# Patient Record
Sex: FEMALE | Race: WHITE | NOT HISPANIC OR LATINO
[De-identification: names, ages, dates, MRNs, and addresses within clinical notes are randomized per-mention and may not be internally consistent; named-entity substitution may affect disease eponyms.]

---
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
## 2023-01-06 NOTE — ED PROVIDER NOTE - PROVIDER TOKENS
Attending Attestation (For Attendings USE Only)... FREE:[LAST:[YOUR PEDIATRICIAN],PHONE:[(   )    -],FAX:[(   )    -],FOLLOWUP:[1-3 Days]]

## 2023-03-28 NOTE — ED PROVIDER NOTE - IV ALTEPLASE EXCL REL HIDDEN
Assessment/Plan:  Problem List Items Addressed This Visit    None  Visit Diagnoses     Candida vaginitis    -  Primary    Relevant Medications    fluconazole (DIFLUCAN) 150 mg tablet    clotrimazole-betamethasone (LOTRISONE) 1-0 05 % cream    Other Relevant Orders    POCT wet mount (Completed)             Candida Vaginitis      Candida vaginitis diagnosed on exam today based on symptoms and clinical findings  Discussed with patient diagnosis in detail including risk factors, signs and symptoms  Treatment was indicated for relief of symptoms and was prescribed antibiotics  Discussed with patient vulvar hygiene and avoidance of intercourse during treatment  Antibiotics were prescribed and patient advised to complete course of antibiotics  She was asked to call if with untoward side effects and if no relief of symptoms after treatment  Follow-up is unnecessary if symptoms resolve  Lotrisone ordered for external irritation  [place] of [place]  Subjective   Patient ID: Javier Barber is a 38 y o  female  Patient is here for a problem visit  Chief Complaint   Patient presents with   • Vaginitis     Pt with c/o redness, soreness, and slight pain  Pt did also have some white discharge over the last week  H/o gastric sleeve, 100 lbs weight loss     C/o redness and soreness in vaginal area  She reports vaginal discharge, no odor, white discharge  Still sexually active x 5 years    Uses contraception condoms  She has no UTI symptoms, no frequency/burning with urination    Menstrual History:  OB History        0    Para   0    Term   0       0    AB   0    Living   0       SAB   0    IAB   0    Ectopic   0    Multiple   0    Live Births   0                  Patient's last menstrual period was 2023 (approximate)           Past Medical History:   Diagnosis Date   • Abnormal Pap smear of cervix    • Asthma    • HPV (human papilloma virus) infection        Past Surgical History:   Procedure Laterality Date   • BARIATRIC SURGERY     • COLPOSCOPY     • GALLBLADDER SURGERY         Social History     Tobacco Use   • Smoking status: Former     Packs/day: 0 25     Types: Cigarettes     Quit date: 2018     Years since quittin 2   • Smokeless tobacco: Never   • Tobacco comments:     quit 6-8months ago   Vaping Use   • Vaping Use: Never used   Substance Use Topics   • Alcohol use:  Yes   • Drug use: No        Allergies   Allergen Reactions   • Other      Seasonal allergies   • Metoclopramide Anxiety         Current Outpatient Medications:   •  ALPRAZolam (XANAX) 1 mg tablet, Take 1 mg by mouth 3 (three) times a day as needed, Disp: , Rfl:   •  amphetamine-dextroamphetamine (ADDERALL) 10 mg tablet, Take 10 mg by mouth daily, Disp: , Rfl:   •  clotrimazole-betamethasone (LOTRISONE) 1-0 05 % cream, Apply topically 2 (two) times a day, Disp: 30 g, Rfl: 2  •  Docusate Sodium (DSS) 100 MG CAPS, Take 100 mg by mouth 2 (two) times a day, Disp: , Rfl:   •  fluconazole (DIFLUCAN) 150 mg tablet, Take 1 tablet (150 mg total) by mouth every 3 (three) days for 3 doses, Disp: 3 tablet, Rfl: 0  •  Ivermectin 1 % CREA, , Disp: , Rfl:   •  ketoconazole (NIZORAL) 2 % cream, PLEASE SEE ATTACHED FOR DETAILED DIRECTIONS, Disp: , Rfl:   •  ketoconazole (NIZORAL) 2 % shampoo, LATHER ON SCALP, FACE, EYEBROW AND EARS ONCE DAILY 5-6 DAYS A WEEK AS NEEDED FOR SCALINESS, Disp: , Rfl:   •  levothyroxine 88 mcg tablet, Take 88 mcg by mouth daily, Disp: , Rfl:   •  omeprazole (PriLOSEC) 20 mg delayed release capsule, Take 20 mg by mouth 2 (two) times a day, Disp: , Rfl:   •  ondansetron (ZOFRAN) 4 mg tablet, TAKE 1 TABLET BY MOUTH DAILY AS NEEDED FOR NAUSEA OR VOMITING , Disp: , Rfl:   •  TiZANidine (ZANAFLEX) 4 MG capsule, Take 4 mg by mouth 3 (three) times a day, Disp: , Rfl:   •  ALPRAZolam ER (XANAX XR) 0 5 MG 24 hr tablet, 1/2 tablet to 1 tablet once a day prn, Disp: 30 tablet, Rfl: 1  •  levothyroxine 50 mcg tablet, Take 50 mcg by mouth daily, Disp: , Rfl:       Review of Systems   Constitutional: Negative  HENT: Negative  Eyes: Negative  Respiratory: Negative  Cardiovascular: Negative  Gastrointestinal: Negative  Endocrine: Negative  Genitourinary:        As noted in HPI   Musculoskeletal: Negative  Skin: Negative  Allergic/Immunologic: Negative  Neurological: Negative  Hematological: Negative  Psychiatric/Behavioral: Negative  /74 (BP Location: Right arm, Patient Position: Sitting, Cuff Size: Adult)   Pulse 98   Temp 98 4 °F (36 9 °C) (Tympanic)   Wt 90 7 kg (200 lb)   LMP 03/07/2023 (Approximate)   BMI 30 86 kg/m²       Physical Exam  Constitutional:       General: She is not in acute distress  Appearance: She is well-developed  Genitourinary:      Bladder and urethral meatus normal       No lesions in the vagina  Right Labia: No rash, tenderness, lesions, skin changes or Bartholin's cyst      Left Labia: No tenderness, lesions, skin changes, Bartholin's cyst or rash  Vaginal discharge (white ) present  No vaginal erythema, tenderness or bleeding  No vaginal prolapse present  No vaginal atrophy present  Right Adnexa: not tender, not full and no mass present  Left Adnexa: not tender, not full and no mass present  No cervical polyp  Uterus is not enlarged or tender  No uterine mass detected  Pelvic exam was performed with patient in the lithotomy position  Rectum:      No tenderness  Cardiovascular:      Rate and Rhythm: Normal rate  Pulmonary:      Effort: Pulmonary effort is normal    Abdominal:      General: There is no distension  Palpations: Abdomen is soft  Tenderness: There is no abdominal tenderness  Neurological:      Mental Status: She is alert and oriented to person, place, and time  Skin:     General: Skin is warm and dry     Psychiatric:         Behavior: Behavior normal  Future Appointments   Date Time Provider Serge Taveras   4/12/2023 10:30 AM Brenda [facility]

## 2023-11-21 NOTE — ED PROVIDER NOTE - CAPILLARY REFILL
less than 2 seconds Consent (Ear)/Introductory Paragraph: The rationale for Mohs was explained to the patient and consent was obtained. The risks, benefits and alternatives to therapy were discussed in detail. Specifically, the risks of ear deformity, infection, scarring, bleeding, prolonged wound healing, incomplete removal, allergy to anesthesia, nerve injury and recurrence were addressed. Prior to the procedure, the treatment site was clearly identified and confirmed by the patient. All components of Universal Protocol/PAUSE Rule completed.

## 2024-07-26 NOTE — ED PROCEDURE NOTE - CPROC ED DIRECTIONAL
Assessment per SGNA guidelines  Non labored breathing, skin dry warm and appropriate for race.Abdomen soft      dorsal/left